# Patient Record
Sex: MALE | Race: WHITE | Employment: STUDENT | URBAN - METROPOLITAN AREA
[De-identification: names, ages, dates, MRNs, and addresses within clinical notes are randomized per-mention and may not be internally consistent; named-entity substitution may affect disease eponyms.]

---
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
## 2020-01-17 ENCOUNTER — HOSPITAL ENCOUNTER (OUTPATIENT)
Dept: MRI IMAGING | Age: 20
Discharge: HOME | End: 2020-01-17
Attending: ORTHOPAEDIC SURGERY
Payer: COMMERCIAL

## 2020-01-17 DIAGNOSIS — M25.561 RIGHT KNEE PAIN: ICD-10-CM

## 2020-01-17 DIAGNOSIS — M22.2X1 PATELLOFEMORAL DISORDER OF RIGHT KNEE: ICD-10-CM

## 2020-01-17 PROCEDURE — 73721 MRI JNT OF LWR EXTRE W/O DYE: CPT

## 2021-01-29 ENCOUNTER — TRANSCRIBE ORDER (OUTPATIENT)
Dept: SCHEDULING | Age: 21
End: 2021-01-29

## 2021-01-29 DIAGNOSIS — S76.311A: Primary | ICD-10-CM

## 2021-02-03 ENCOUNTER — HOSPITAL ENCOUNTER (OUTPATIENT)
Dept: MRI IMAGING | Age: 21
Discharge: HOME | End: 2021-02-03
Attending: FAMILY MEDICINE
Payer: COMMERCIAL

## 2021-02-03 DIAGNOSIS — S76.311A: ICD-10-CM

## 2021-02-03 PROCEDURE — 73718 MRI LOWER EXTREMITY W/O DYE: CPT

## 2021-10-06 ENCOUNTER — TRANSCRIBE ORDER (OUTPATIENT)
Dept: SCHEDULING | Age: 21
End: 2021-10-06

## 2021-10-06 DIAGNOSIS — S43.025A: Primary | ICD-10-CM

## 2021-10-12 ENCOUNTER — HOSPITAL ENCOUNTER (OUTPATIENT)
Dept: MRI IMAGING | Age: 21
Discharge: HOME | End: 2021-10-12
Attending: ORTHOPAEDIC SURGERY
Payer: COMMERCIAL

## 2021-10-12 ENCOUNTER — APPOINTMENT (OUTPATIENT)
Dept: GENERAL RADIOLOGY | Age: 21
End: 2021-10-12
Attending: ORTHOPAEDIC SURGERY
Payer: COMMERCIAL

## 2021-10-12 ENCOUNTER — HOSPITAL ENCOUNTER (OUTPATIENT)
Dept: GENERAL RADIOLOGY | Age: 21
Discharge: HOME | End: 2021-10-12
Attending: ORTHOPAEDIC SURGERY
Payer: COMMERCIAL

## 2021-10-12 ENCOUNTER — APPOINTMENT (OUTPATIENT)
Dept: MRI IMAGING | Age: 21
End: 2021-10-12
Attending: ORTHOPAEDIC SURGERY
Payer: COMMERCIAL

## 2021-10-12 DIAGNOSIS — S43.025A: ICD-10-CM

## 2021-10-12 PROCEDURE — 77030014113 XR ARTHRO SHOULDER LT

## 2021-10-12 PROCEDURE — A9575 INJ GADOTERATE MEGLUMI 0.1ML: HCPCS | Performed by: RADIOLOGY

## 2021-10-12 PROCEDURE — 74011000636 HC RX REV CODE- 636: Performed by: RADIOLOGY

## 2021-10-12 PROCEDURE — 74011000250 HC RX REV CODE- 250: Performed by: RADIOLOGY

## 2021-10-12 PROCEDURE — 74011250636 HC RX REV CODE- 250/636: Performed by: RADIOLOGY

## 2021-10-12 PROCEDURE — 73222 MRI JOINT UPR EXTREM W/DYE: CPT

## 2021-10-12 RX ORDER — LIDOCAINE HYDROCHLORIDE 10 MG/ML
4 INJECTION INFILTRATION; PERINEURAL
Status: COMPLETED | OUTPATIENT
Start: 2021-10-12 | End: 2021-10-12

## 2021-10-12 RX ORDER — GADOTERATE MEGLUMINE 376.9 MG/ML
0.1 INJECTION INTRAVENOUS
Status: COMPLETED | OUTPATIENT
Start: 2021-10-12 | End: 2021-10-12

## 2021-10-12 RX ADMIN — IOHEXOL 5 ML: 300 INJECTION, SOLUTION INTRAVENOUS at 09:23

## 2021-10-12 RX ADMIN — GADOTERATE MEGLUMINE 0.1 ML: 376.9 INJECTION INTRAVENOUS at 09:23

## 2021-10-12 RX ADMIN — LIDOCAINE HYDROCHLORIDE 4 ML: 10 INJECTION, SOLUTION INFILTRATION; PERINEURAL at 09:23

## 2021-10-12 RX ADMIN — SODIUM BICARBONATE 1 ML: 0.2 INJECTION, SOLUTION INTRAVENOUS at 10:00

## 2022-09-22 ENCOUNTER — TRANSCRIBE ORDER (OUTPATIENT)
Dept: SCHEDULING | Age: 22
End: 2022-09-22

## 2022-09-22 DIAGNOSIS — S83.281A ACUTE LATERAL MENISCUS TEAR OF RIGHT KNEE: Primary | ICD-10-CM

## 2022-09-23 ENCOUNTER — HOSPITAL ENCOUNTER (OUTPATIENT)
Dept: MRI IMAGING | Age: 22
Discharge: HOME | End: 2022-09-23
Attending: ORTHOPAEDIC SURGERY
Payer: COMMERCIAL

## 2022-09-23 DIAGNOSIS — S83.281A ACUTE LATERAL MENISCUS TEAR OF RIGHT KNEE: ICD-10-CM

## 2022-09-23 PROCEDURE — 73721 MRI JNT OF LWR EXTRE W/O DYE: CPT

## 2023-02-22 ENCOUNTER — HOSPITAL ENCOUNTER (OUTPATIENT)
Dept: MRI IMAGING | Age: 23
Discharge: HOME | End: 2023-02-22
Attending: ORTHOPAEDIC SURGERY
Payer: COMMERCIAL

## 2023-02-22 DIAGNOSIS — M25.569 KNEE PAIN: ICD-10-CM

## 2023-02-22 PROCEDURE — 73721 MRI JNT OF LWR EXTRE W/O DYE: CPT

## 2023-05-30 PROBLEM — Z00.00 ENCOUNTER FOR PREVENTIVE HEALTH EXAMINATION: Status: ACTIVE | Noted: 2023-05-30

## 2023-05-31 ENCOUNTER — APPOINTMENT (OUTPATIENT)
Dept: ORTHOPEDIC SURGERY | Facility: CLINIC | Age: 23
End: 2023-05-31
Payer: COMMERCIAL

## 2023-05-31 VITALS — WEIGHT: 155 LBS | BODY MASS INDEX: 22.96 KG/M2 | HEIGHT: 69 IN

## 2023-05-31 DIAGNOSIS — S83.271A COMPLEX TEAR OF LATERAL MENISCUS, CURRENT INJURY, RIGHT KNEE, INITIAL ENCOUNTER: ICD-10-CM

## 2023-05-31 DIAGNOSIS — M65.9 SYNOVITIS AND TENOSYNOVITIS, UNSPECIFIED: ICD-10-CM

## 2023-05-31 DIAGNOSIS — M23.91 UNSPECIFIED INTERNAL DERANGEMENT OF RIGHT KNEE: ICD-10-CM

## 2023-05-31 DIAGNOSIS — S83.511D SPRAIN OF ANTERIOR CRUCIATE LIGAMENT OF RIGHT KNEE, SUBSEQUENT ENCOUNTER: ICD-10-CM

## 2023-05-31 PROCEDURE — 99204 OFFICE O/P NEW MOD 45 MIN: CPT

## 2023-05-31 PROCEDURE — 73564 X-RAY EXAM KNEE 4 OR MORE: CPT | Mod: LT

## 2023-05-31 NOTE — IMAGING
[de-identified] : \par RIGHT KNEE\par Inspection:  mild effusion\par Palpation: lateral joint line tenderness and KFC ttp\par Knee Range of Motion:  0-130 \par Strength: 5/5 Quadriceps strength, 5/5 Hamstring strength\par Neurological: light touch is intact throughout\par Ligament Stability and Special Tests: \par McMurrays: Positive\par Lachman: neg\par Pivot Shift: pos\par Posterior Drawer: neg\par Valgus: neg\par Varus: neg\par Patella Apprehension: neg\par Patella Maltracking: neg\par \par

## 2023-05-31 NOTE — HISTORY OF PRESENT ILLNESS
[de-identified] : 23 year old male  ([facility], [place] , may try and play another season)   right knee pain since  9/2022 worsening since Feb 2023 after lax\par The pain is located lateral and deep\par The pain is associated with catching, buckling swelling  \par Worse with sprinting and better at rest.\par Has tried icing, Advil, CSI 3/2023. PT, HEP\par H/O 1/23/20 right knee PLM, 10/19/2022 LBR and chondro (LFC) right knee - Dr. Zhu and Dr. Seals - [place]\par MRI performed at [place] for review\par \par

## 2023-05-31 NOTE — ASSESSMENT
[FreeTextEntry1] : Imaging was reviewed and independently interpreted\par mri right knee [facility] 5/25/23 - partial intrasubstan tear acl, recurrent LMT (AH/body), LFC focal chondral defect 7X9 mm, eff, synov\par \par Right X-Ray Examination of the KNEE (4 views): there are no fractures, subluxations or dislocations.\par \par - We discussed their diagnosis and treatment options at length including the risks and benefits of both surgical and non-surgical options.. \par - Given their active lifestyle along with pain and mechanical symptoms they are a surgical candidate.\par - The risks, benefits, and alternatives to right knee PLM vs repair, synov, abras (LFC),  biopsy (poss subsq ACI) were discussed with the patient, all questions were answered.\par \par (may need subq PILI with ACLR - will eval acl at surg)\par \par \par \par \par \par

## 2023-06-21 ENCOUNTER — APPOINTMENT (OUTPATIENT)
Dept: ORTHOPEDIC SURGERY | Facility: AMBULATORY SURGERY CENTER | Age: 23
End: 2023-06-21
Payer: COMMERCIAL

## 2023-06-21 PROCEDURE — 29879 ARTHRS KNE SRG ABRASJ ARTHRP: CPT | Mod: RT

## 2023-06-21 PROCEDURE — 29875 ARTHRS KNEE SURG SYNVCT LMTD: CPT | Mod: AS,59,RT

## 2023-06-21 PROCEDURE — 29875 ARTHRS KNEE SURG SYNVCT LMTD: CPT | Mod: 59,RT

## 2023-06-21 PROCEDURE — 29881 ARTHRS KNE SRG MNISECTMY M/L: CPT | Mod: AS,59,RT

## 2023-06-21 PROCEDURE — 29881 ARTHRS KNE SRG MNISECTMY M/L: CPT | Mod: 59,RT

## 2023-06-21 PROCEDURE — 29879 ARTHRS KNE SRG ABRASJ ARTHRP: CPT | Mod: AS,RT

## 2023-06-21 PROCEDURE — 29870 ARTHRS KNEE DX W/WO SYN BX: CPT | Mod: AS,59,RT

## 2023-06-21 PROCEDURE — 29870 ARTHRS KNEE DX W/WO SYN BX: CPT | Mod: 59,RT

## 2023-06-21 RX ORDER — IBUPROFEN 600 MG/1
600 TABLET, FILM COATED ORAL EVERY 6 HOURS
Qty: 20 | Refills: 0 | Status: ACTIVE | COMMUNITY
Start: 2023-06-21 | End: 1900-01-01

## 2023-06-21 RX ORDER — PROMETHAZINE HYDROCHLORIDE 12.5 MG/1
12.5 TABLET ORAL EVERY 6 HOURS
Qty: 20 | Refills: 0 | Status: ACTIVE | COMMUNITY
Start: 2023-06-21 | End: 1900-01-01

## 2023-06-21 RX ORDER — ACETAMINOPHEN 500 MG/1
500 TABLET ORAL 3 TIMES DAILY
Qty: 90 | Refills: 0 | Status: ACTIVE | COMMUNITY
Start: 2023-06-21 | End: 1900-01-01

## 2023-06-21 RX ORDER — OXYCODONE 5 MG/1
5 TABLET ORAL
Qty: 16 | Refills: 0 | Status: ACTIVE | COMMUNITY
Start: 2023-06-21 | End: 1900-01-01

## 2023-06-28 ENCOUNTER — APPOINTMENT (OUTPATIENT)
Dept: ORTHOPEDIC SURGERY | Facility: CLINIC | Age: 23
End: 2023-06-28
Payer: COMMERCIAL

## 2023-06-28 PROCEDURE — 99024 POSTOP FOLLOW-UP VISIT: CPT

## 2023-06-28 NOTE — HISTORY OF PRESENT ILLNESS
[de-identified] : 23 year old male  ([facility], [place] , may try and play another season)   right knee pain since  9/2022 worsening since Feb 2023 after lax\par The pain is located lateral and deep\par The pain is associated with catching, buckling swelling  \par Worse with sprinting and better at rest.\par Has tried icing, Advil, CSI 3/2023. PT, HEP\par H/O 1/23/20 right knee PLM, 10/19/2022 LBR and chondro (LFC) right knee - Dr. Zhu and Dr. Seals - [place]\par MRI performed at [place] for review\par \par ***s/p R knee PLM, synov, abras (LFC 1.3 AP X 1.4ML), biopsy on 6/21/23***\par \par 6/28/23 - po visit, pain well controlled, starting PT at [facility] in [place]

## 2023-06-28 NOTE — IMAGING
[de-identified] : \par \par RIGHT  KNEE\par Inspection:  mild effusion, incisions c/d/i\par Palpation: LFC ttp\par Knee Range of Motion:  0-120\par Strength: 4/5 Quadriceps strength, 5/5 Hamstring strength\par Neurological: light touch is intact throughout\par Ligament Stability and Special Tests: \par McMurrays: neg\par Lachman: neg\par Pivot Shift: neg\par Posterior Drawer: neg\par Valgus: neg\par Varus: neg\par Patella Apprehension: neg\par Patella Maltracking: neg\par

## 2023-06-28 NOTE — ASSESSMENT
[FreeTextEntry1] : s/p R knee PLM, synov, abras (LFC 1.3 AP X 1.4ML), biopsy on 6/21/23\par \par - PT on post op protocol\par - The patient was provided with a prescription for Physical Therapy \par - Home exercises program learned at physical therapy.\par - The patient was advised to apply ice (wrapped in a towel or protective covering) to the area daily (20 minutes at a time, 2-4X/day).\par - fu 8 week re-eval\par \par \par discussed given larger focal chondral defect he will likely need 2nd surgery for PILI since this represents a complicate injury with risk to the overall function of his knee if not treated\par \par he would like to try and make it through final lax season first\par \par \par \par \par \par

## 2023-08-09 ENCOUNTER — APPOINTMENT (OUTPATIENT)
Dept: ORTHOPEDIC SURGERY | Facility: CLINIC | Age: 23
End: 2023-08-09
Payer: COMMERCIAL

## 2023-08-09 PROCEDURE — 99024 POSTOP FOLLOW-UP VISIT: CPT

## 2023-08-09 PROCEDURE — L2397: CPT | Mod: RT

## 2023-08-09 PROCEDURE — L1833: CPT | Mod: RT

## 2023-08-09 NOTE — HISTORY OF PRESENT ILLNESS
[de-identified] : 23 year old male  ([facility] / possible [place] now, [place] , may try and play another season)   right knee pain since  9/2022 worsening since Feb 2023 after lax The pain is located lateral and deep The pain is associated with catching, buckling swelling   Worse with sprinting and better at rest. Has tried icing, Advil, CSI 3/2023. PT, HEP H/O 1/23/20 right knee PLM, 10/19/2022 LBR and chondro (LFC) right knee - Dr. Zhu and Dr. Seals - [place] MRI performed at [place] for review  ***s/p R knee PLM, synov, abras (LFC 1.3 AP X 1.4ML), biopsy on 6/21/23***  6/28/23 - po visit, pain well controlled, starting PT at [facility] in [place] 8/9/23 - doing PT and HEP with some improvement, some pain deep and lateral with activity still

## 2023-08-09 NOTE — IMAGING
[de-identified] :  RIGHT  KNEE Inspection:  well healed surg scars, mild effusion,  Palpation: LFC ttp Knee Range of Motion:  0--135 Strength: 5/5 Quadriceps strength, 5/5 Hamstring strength, 5-/5 hip abductors Neurological: light touch is intact throughout Ligament Stability and Special Tests:  McMurrays: neg Lachman: neg Pivot Shift: neg Posterior Drawer: neg Valgus: neg Varus: neg Patella Apprehension: neg Patella Maltracking: neg

## 2023-08-09 NOTE — DISCUSSION/SUMMARY
[de-identified] : The patient  was advised of the diagnosis. The natural history of the pathology was explained in full in layman's terms. Several different treatment options were discussed and explained including the risks and benefits of both surgical and non-surgical treatments.  The risks, benefits, and alternatives to right knee open autologous chondrocyte implantation were reviewed. Specifically, I reviewed that there is an extensive post-op rehab course and immediate post-op restrictions.   We also discussed that the risks of surgery include but are not limited to pain, infection (superficial or deep), bleeding, vascular injury, numbness, tingling, nerve damage (direct or indirect), scarring, non-healing, wound breakdown, failure to resolve symptoms, symptom recurrence, the need for further surgery, as well as medical complications such as blood clots, pulmonary embolism, heart attack, stroke, and other anesthesia complications including even death.  We also discussed that there could be injury to the infrapatellar branch of the saphenous nerve, causing permanent numbness near the incision.  They understood all the risks, accepted them, and understood that other complications could occur that are not mentioned above.    The intraoperative plan, post-operative plan, post-operative expectations and limitations were explained in full. The importance of postoperative rehabilitation and restriction compliance was emphasized. Expectations from non-surgical treatment were explained in full as well.  They demonstrated a complete understanding of the treatment alternatives and requested to proceed with surgery.  This will be scheduled accordingly.

## 2023-08-09 NOTE — ASSESSMENT
[FreeTextEntry1] : s/p R knee PLM, synov, abras (LFC 1.3 AP X 1.4ML), biopsy on 6/21/23    - The patient was advised of the diagnosis.  The natural history of the pathology was explained to the patient in layman's terms.  Several different treatment options were discussed and explained including the risks and benefits of both surgical and non-surgical treatments.  All questions and concerns were answered. - cont PT on post op protocol - The patient was provided with a prescription for Physical Therapy  - Hinged Knee brace in order to minimize buckling and instability  - Home exercises program learned at physical therapy. - fu 2 months re-eval  discussed given larger focal chondral defect he will need 2nd surgery for PILI since this represents a complicate injury with risk to the overall function of his knee if not treated. he would like to try and make it through final lax season first and then proceed with 2nd surgery r/b/a R open ACI (LFC) discussed at length

## 2023-10-11 ENCOUNTER — APPOINTMENT (OUTPATIENT)
Dept: ORTHOPEDIC SURGERY | Facility: CLINIC | Age: 23
End: 2023-10-11
Payer: COMMERCIAL

## 2023-10-11 DIAGNOSIS — M23.8X1 OTHER INTERNAL DERANGEMENTS OF RIGHT KNEE: ICD-10-CM

## 2023-10-11 DIAGNOSIS — S83.31XD TEAR OF ARTICULAR CARTILAGE OF RIGHT KNEE, CURRENT, SUBSEQUENT ENCOUNTER: ICD-10-CM

## 2023-10-11 PROCEDURE — 99213 OFFICE O/P EST LOW 20 MIN: CPT
